# Patient Record
Sex: MALE | Race: OTHER
[De-identification: names, ages, dates, MRNs, and addresses within clinical notes are randomized per-mention and may not be internally consistent; named-entity substitution may affect disease eponyms.]

---

## 2019-12-16 ENCOUNTER — HOSPITAL ENCOUNTER (OUTPATIENT)
Dept: HOSPITAL 72 - PAN | Age: 59
Discharge: HOME | End: 2019-12-16
Payer: MEDICARE

## 2019-12-16 DIAGNOSIS — F20.9: ICD-10-CM

## 2019-12-16 DIAGNOSIS — D50.9: Primary | ICD-10-CM

## 2019-12-16 DIAGNOSIS — E78.00: ICD-10-CM

## 2019-12-16 DIAGNOSIS — K21.9: ICD-10-CM

## 2019-12-18 NOTE — CONSULTATION
DATE OF CONSULTATION:  12/16/2019

CHIEF COMPLAINT:  Referral for anemia.



HISTORY OF PRESENT ILLNESS:  The patient is a poor historian, has numerous

medical problems which I will dictate in a second, was referred to us for

anemia.



PAST MEDICAL HISTORY:

1. Schizophrenia.

2. Hypernatremia.

3. GERD.

4. Hypercholesterolemia.

5. BPH.

6. Anemia.

7. Iron-deficiency.



MEDICATIONS:  Please see medication reconciliation list.



FAMILY HISTORY:  Noncontributory.



SOCIAL HISTORY:  Currently lives in a nursing home.  No recent history of

tobacco, alcohol, or drug abuse.



ALLERGIES:  No known drug allergies.



REVIEW OF SYSTEMS:  Limited.



PHYSICAL EXAMINATION:

GENERAL:  This is a well-developed male, in no acute distress.

HEENT:  Normocephalic and atraumatic.  Mild pale conjunctivae.  The patient

had overall poor dentition.

NECK:  Supple.  No evidence of obvious lymphadenopathy.

CARDIOVASCULAR:  Regular rate and rhythm.  Plus S1 and S2.

LUNGS:  Decreased breath sounds bilaterally based on the supine

exam.

ABDOMEN:  Soft and nontender.  No rebound.  No guarding.  No peritoneal

sign.

EXTREMITIES:  No cyanosis.  No clubbing.  No edema.



ASSESSMENT AND PLAN:  The patient is a 59-year-old male was referred to us

for evaluation of iron-deficiency anemia.  The patient needs an endoscopy

and colonoscopy especially as he never had any of these procedure done

before and he is anemic and iron deficient, but the patient is very

adamant and refusing.  I explained to him the risks and benefits.  If he

does not agree, he might have cancer and death.  The patient states that

he does not care and he does not want to have these procedures done, so

_____ to be referred back to us when he is ready for having the procedures

done, so we can evaluate him.









  ______________________________________________

  Ricky Fernandes M.D.





DR:  Jae

D:  12/18/2019 13:14

T:  12/18/2019 21:07

JOB#:  [id number]

CC: